# Patient Record
Sex: FEMALE | Race: WHITE | HISPANIC OR LATINO | Employment: FULL TIME | URBAN - METROPOLITAN AREA
[De-identification: names, ages, dates, MRNs, and addresses within clinical notes are randomized per-mention and may not be internally consistent; named-entity substitution may affect disease eponyms.]

---
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
## 2023-01-03 ENCOUNTER — TREATMENT (OUTPATIENT)
Dept: PHYSICAL THERAPY | Facility: CLINIC | Age: 18
End: 2023-01-03
Payer: COMMERCIAL

## 2023-01-03 DIAGNOSIS — R10.30 LOWER ABDOMINAL PAIN: Primary | ICD-10-CM

## 2023-01-03 DIAGNOSIS — M79.18 MUSCULOSKELETAL PAIN: ICD-10-CM

## 2023-01-03 PROCEDURE — 97110 THERAPEUTIC EXERCISES: CPT | Performed by: PHYSICAL THERAPIST

## 2023-01-03 NOTE — PROGRESS NOTES
Physical Therapy Daily Treatment Note    [address] [facility], [address]  [place] [address]    Patient: Phyllis Jackson   : 2005  Referring practitioner: Celina Arzola MD  Date of Initial Visit: Type: THERAPY  Noted: 10/21/2022  Today's Date: 1/3/2023  Patient seen for 8 sessions       Visit Diagnoses:    ICD-10-CM ICD-9-CM   1. Lower abdominal pain  R10.30 789.09   2. Musculoskeletal pain  M79.18 729.1       Subjective   Patient reports she can run 1/2 mile without stopping, then walks and attempts to run one more 1/2 mile, limited most by SOA, denies abdominal pain. States  her pain level is 0/10 upon arrival. Stats her back pain is the same, \"doesn't hurt, but is uncomfortable.\" Feels like she has to stretch it especially after prolonged sitting.     Objective   See Exercise, Manual, and Modality Logs for complete treatment.       Assessment/Plan   Pt denies abdominal pain, reports return to gym and doing well with progression of spinal stabilization exercises. She does fatigue quickly with active right hip abduction during side stepping and fatigued right glutes with bird dogs. Reviewed current exercises for home, with addition of side stepping and bird dogs. She is complaint with current HEP and seems more motivated with good support system from her mother, will hold further visits to conserve total amount. Encouraged patient to f/u with therapy if mid back pain returns/worsens. If lack of f/u required in the next few weeks, will d/c from [facility].       Timed:         Manual Therapy:    0     mins  40072;     Therapeutic Exercise:    30     mins  74293;     Neuromuscular Chris:    0    mins  88855;    Therapeutic Activity:     0     mins  95466;     Gait Trainin     mins  37664;     Ultrasound:     0     mins  72446;    Ionto                               0    mins   12424  Self Care                       0     mins   00312  Canalith Repos    0     mins 14726      Un-Timed:  Electrical  Stimulation:    0     mins  74996 ( );  Dry Needling     0     mins self-pay  Traction     0     mins 59874      Timed Treatment:   30   mins   Total Treatment:     30   mins    Corinne E. Perkins, PT  [place] License: [id number]

## 2023-04-04 NOTE — PROGRESS NOTES
Physical Therapy Initial Evaluation and Plan of Care    [address] [facility], [address]  [place] [address]    Patient: Phyllis Jackson   : 2005  Diagnosis/ICD-10 Code:  Lower abdominal pain [R10.30]  Referring practitioner: Celina Arzola MD  Date of Initial Visit: 10/21/2022  Today's Date: 10/21/2022  Patient seen for 1 sessions           Subjective Questionnaire: Oswestry:       Subjective Evaluation    History of Present Illness  Mechanism of injury: L>R lower abdominal pain. She first noticed it about 5 months ago. She does not remember any specific injury, but she started feeling sharp pain in her L lower abdomen and had to leave work. She has had to leave work multiple times since then due to severe pain and difficulty standing. Patient reports activity and being on her period increase her pain. She reports experiencing one instance on 6/10 when she experienced a flare up and also experiencing significant vaginal bleeding (20 min gush of bright red blood). Patient feels like her symptoms are related to anxiety.    Pelvic floor screening: negative for urinary incontinence, constipation, pain with intercourse/feminine hygiene use    Medical history: bracing for scoliosis, anxiety/depression with suicidal ideations (states they are intermittent, patient reports having a plan but does not currently plan to enact it), congenital cardiac problems (bicuspid aortic valve with dilated LV) with chest pain caused by anxiety and heavy lifting      Patient Occupation: works in  Pain  Current pain ratin  At best pain ratin  At worst pain rating: 10  Alleviating factors: dicyclomine.  Exacerbated by: activity, when on her period.  Progression: no change    Patient Goals  Patient goals for therapy: decreased pain             Objective          Postural Observations    Additional Postural Observation Details  Standing: L upper thoracic and R lower thoracic scoliotic curve    Palpation   Left    Hypertonic in the adductor lino, iliopsoas and rectus abdominus.   Tenderness of the adductor lino, iliopsoas and rectus abdominus.     Right   Hypertonic in the iliopsoas and rectus abdominus. Tenderness of the iliopsoas and rectus abdominus.     Lumbar Spine Comments  Left rectus abdominus: familiar symptoms lower and insertional. Right rectus abdominus: familiar symptomsfamiliar symptoms lower and insertional.     Additional Palpation Details  Joint mobility: hypermobile lumbar spine, local pain at lower lumbar segments but no anterior symptoms    Active Range of Motion     Lumbar   Normal active range of motion    Additional Active Range of Motion Details  L upper rib hump and R lower rib hump during forward bending    Passive Range of Motion   Left Hip   Flexion: WFL  Extension: Left hip passive extension: WFL but less than R.   Internal rotation (90/90): WFL    Right Hip   Flexion: WFL  Extension: WFL  Internal rotation (90/90): WFL    Strength/Myotome Testing     Left Hip   Planes of Motion   Extension: 4+  Abduction: 4+  Adduction: 4 (pain)    Right Hip   Planes of Motion   Extension: 4+  Abduction: 4    Additional Strength Details  Double leg lowering: loses pelvic tilt at about 70 deg hip flexion with L groin pain reported     Tests     Left Pelvic Girdle/Sacrum   Negative: sacrum compression, gapping, sacral spring and thigh thrust.     Right Pelvic Girdle/Sacrum   Negative: sacrum compression, gapping, sacral spring and thigh thrust.     Left Hip   Negative FADIR and scour.     Right Hip   Negative FADIR and scour.           Assessment & Plan     Assessment  Impairments: lacks appropriate home exercise program and pain with function  Functional Limitations: lifting, pulling and uncomfortable because of pain  Assessment details: Patient presents with chronic, intermittent lower abdominal pain. Though palpation of some abdominal muscles- such as lower rectus abdominus, L>R iliopsoas, and L hip  adductors- caused pain, a confident musculoskeletal etiology of patient's main complaints was unable to be established. Her main c/o of pain was not replicated with specific testing but she reported gradual increase in baseline dull ache as the physical examination progressed. Screening for the lumbar spine, pelvis, and hips was unremarkable. She presents with comorbid scoliosis and reported occasional axial low back pain toward the end of the assessment. She wishes to address her low back pain in addition to chest pain (if there is a musculoskeletal contribution) during PT. To gain a more comprehensive understanding of her pelvic girdle function, she will be assessed by pelvic floor PT next visit. Once that aspect of her pelvic function is understood, she will receive a short trial of PT to determine if her condition is mainly musculoskeletal and she is appropriate for an extended POC. Otherwise, she will be referred back to her referring physician.    PT gerry Rich was present for all objective assessment portion of the examination.    Barriers to therapy: unclear etiology of symptoms, significant cardiac and psychological comorbidities  Prognosis: fair    Goals  Plan Goals: Short Term Goals (1 weeks)  1. Patient to be compliant with initial HEP    Long Term Goals (3 weeks)  1. Patient to demonstrate 5/5 gross hip strength.  2. Patient to improve Modified Oswestry score to < 8/10.  3. Patient to demonstrate independence with home exercises.      Plan  Therapy options: will be seen for skilled therapy services  Planned modality interventions: cryotherapy, TENS and thermotherapy (hydrocollator packs)  Planned therapy interventions: manual therapy, motor coordination training, neuromuscular re-education, soft tissue mobilization, spinal/joint mobilization, strengthening, therapeutic activities, joint mobilization, home exercise program, functional ROM exercises, abdominal trunk stabilization,  [phone] balance/weight-bearing training, body mechanics training, fine motor coordination training and postural training  Frequency: 2x week  Duration in weeks: 3  Treatment plan discussed with: patient  Plan details: 2x/week for 3 weeks        Timed:  Manual Therapy:    0     mins  96234;  Therapeutic Exercise:    0     mins  48688;     Neuromuscular Chris:    0    mins  71375;    Therapeutic Activity:     0     mins  84769;     Gait Trainin     mins  60009;     Ultrasound:     0     mins  93619;    Electrical Stimulation:    0     mins  48969 ( );    Untimed:  Electrical Stimulation:    0     mins  00815 ( );  Mechanical Traction:    0     mins  60153;     Timed Treatment:   0   mins   Total Treatment:     70   mins    PT SIGNATURE: Lemuel Burk PT   License Number: [id number]  DATE TREATMENT INITIATED: 10/21/2022    Initial Certification  Certification Period: 2023  I certify that the therapy services are furnished while this patient is under my care.  The services outlined above are required by this patient, and will be reviewed every 90 days.     PHYSICIAN: Celina Arzola MD      DATE:     Please sign and return via fax to [phone].. Thank you, [facility].